# Patient Record
Sex: FEMALE | Race: WHITE | NOT HISPANIC OR LATINO | URBAN - METROPOLITAN AREA
[De-identification: names, ages, dates, MRNs, and addresses within clinical notes are randomized per-mention and may not be internally consistent; named-entity substitution may affect disease eponyms.]

---

## 2021-03-31 ENCOUNTER — OFFICE VISIT (OUTPATIENT)
Dept: URBAN - METROPOLITAN AREA CLINIC 51 | Facility: CLINIC | Age: 81
End: 2021-03-31
Payer: MEDICARE

## 2021-03-31 DIAGNOSIS — H52.4 PRESBYOPIA: ICD-10-CM

## 2021-03-31 DIAGNOSIS — H25.13 AGE-RELATED NUCLEAR CATARACT, BILATERAL: ICD-10-CM

## 2021-03-31 DIAGNOSIS — H43.313 VITREOUS MEMBRANES AND STRANDS, BILATERAL: ICD-10-CM

## 2021-03-31 PROCEDURE — 99204 OFFICE O/P NEW MOD 45 MIN: CPT | Performed by: OPTOMETRIST

## 2021-03-31 PROCEDURE — 92134 CPTRZ OPH DX IMG PST SGM RTA: CPT | Performed by: OPTOMETRIST

## 2021-03-31 PROCEDURE — 92133 CPTRZD OPH DX IMG PST SGM ON: CPT | Performed by: OPTOMETRIST

## 2021-03-31 ASSESSMENT — INTRAOCULAR PRESSURE
OS: 16
OD: 16

## 2021-03-31 ASSESSMENT — VISUAL ACUITY
OD: 20/20
OS: 20/25

## 2021-03-31 NOTE — IMPRESSION/PLAN
Impression: Vitreous membranes and strands, bilateral: H43.313. Plan: Ed pt on process of vitreous syneresis. Pt to RTC ASAP if increase in floaters, flashes, or curtain or veil taking away vision.

## 2021-03-31 NOTE — IMPRESSION/PLAN
Impression: Presbyopia: H52.4. Plan: Updated and released pts SRx. Ed on chg in prescription OS>OD. Pt aware cataracts will still limit vision.

## 2021-03-31 NOTE — IMPRESSION/PLAN
Impression: Primary open-angle glaucoma, bilateral, indeterminate stage: H40.1134. Plan: RNFL OS>OD shows inferior thinning. shallow cups OU with PPA. On Latanoprost qhs OU - continue and will re-evaluate effectivity of medication at f/u. RTC within 1 mos for glc testing; VF/IOP check

## 2021-03-31 NOTE — IMPRESSION/PLAN
Impression: Age-related nuclear cataract, bilateral: H25.13. Plan: Cataract visually significant and effecting ADLs or vision. Ed changes glasses will not completely improve vision. Pt recently had back surgery and wishes to defers cataract sx at this time. Updated 4/21/21: Patient called back and wishes to proceed with cataract surgery. Surgical treatment is recommended. Surgical risks and benefits discussed. Patient elects surgical treatment. Recommend surgery OS then OD. Patient is candidate based on HVF needed prior to cataract surgery. 
Aim OD: plano  Aim OS: plano

## 2021-06-22 ENCOUNTER — OFFICE VISIT (OUTPATIENT)
Dept: URBAN - METROPOLITAN AREA CLINIC 52 | Facility: CLINIC | Age: 81
End: 2021-06-22
Payer: MEDICARE

## 2021-06-22 PROCEDURE — 92133 CPTRZD OPH DX IMG PST SGM ON: CPT | Performed by: OPHTHALMOLOGY

## 2021-06-22 PROCEDURE — 92004 COMPRE OPH EXAM NEW PT 1/>: CPT | Performed by: OPHTHALMOLOGY

## 2021-06-22 RX ORDER — PREDNISOLONE ACETATE 10 MG/ML
1 % SUSPENSION/ DROPS OPHTHALMIC
Qty: 10 | Refills: 1 | Status: ACTIVE
Start: 2021-06-22

## 2021-06-22 RX ORDER — KETOROLAC TROMETHAMINE 5 MG/ML
0.5 % SOLUTION/ DROPS OPHTHALMIC
Qty: 5 | Refills: 1 | Status: ACTIVE
Start: 2021-06-22

## 2021-06-22 RX ORDER — OFLOXACIN 3 MG/ML
0.3 % SOLUTION/ DROPS OPHTHALMIC
Qty: 5 | Refills: 1 | Status: ACTIVE
Start: 2021-06-22

## 2021-06-22 ASSESSMENT — INTRAOCULAR PRESSURE
OD: 16
OS: 18

## 2021-06-22 NOTE — IMPRESSION/PLAN
Impression: Primary open-angle glaucoma, bilateral, indeterminate stage: H40.1134. CCT: [id number]/[phone] Plan: IOP doing well on Latanoprost OU QHS - continue on current regimen. Will address glaucoma status after cataract surgery. Stressed the importance of using the eye drops the night before her appointment - including the night prior to her surgery and the night of her surgery.

## 2021-06-22 NOTE — IMPRESSION/PLAN
Impression: Combined forms of age-related cataract, bilateral: H25.813. Plan: OK for CEIOL OS then OD in [address] 27, RL2. AIM FAR. Discussed lens options, Standard or Toric - pending ELISE. Pt is not a candidate for MF lens. Discussed need for glasses for near vision with standard. Discussed diagnosis of cataracts. Cataracts are limiting vision. Discussed risks, benefits and alternatives to surgery including but not limited to: bleeding, infection, risk of vision loss, loss of the eye, need for other surgery. Patient voiced understanding and wishes to proceed. Recommend ELISE. Patient is not a candidate for Dexycu secondary to glaucoma status. Due to the density of the cataract, there is a hazy view of the retina. There may be underlying eye conditions that can't be seen today that may limit BCVA after surgery. Patient expressed understanding.

## 2021-06-30 ENCOUNTER — TESTING ONLY (OUTPATIENT)
Dept: URBAN - METROPOLITAN AREA CLINIC 52 | Facility: CLINIC | Age: 81
End: 2021-06-30
Payer: MEDICARE

## 2021-06-30 PROCEDURE — 92025 CPTRIZED CORNEAL TOPOGRAPHY: CPT | Performed by: OPHTHALMOLOGY

## 2021-06-30 ASSESSMENT — PACHYMETRY
OD: 23.07
OS: 2.73
OD: 2.79

## 2021-07-01 ENCOUNTER — OFFICE VISIT (OUTPATIENT)
Dept: URBAN - METROPOLITAN AREA CLINIC 52 | Facility: CLINIC | Age: 81
End: 2021-07-01
Payer: MEDICARE

## 2021-07-01 DIAGNOSIS — H25.813 COMBINED FORMS OF AGE-RELATED CATARACT, BILATERAL: Primary | ICD-10-CM

## 2021-07-01 PROCEDURE — 99213 OFFICE O/P EST LOW 20 MIN: CPT | Performed by: OPHTHALMOLOGY

## 2021-07-02 NOTE — IMPRESSION/PLAN
Impression: Combined forms of age-related cataract, bilateral: H25.813. Plan: Ok for CEIOL OS in [address], [address]. AIM FAR. Cataracts are limiting vision. Discussed risks, benefits and alternatives to surgery including but not limited to: bleeding, infection, risk of vision loss, loss of the eye, need for other surgery. Patient voiced understanding and wishes to proceed. Discussed doing Dexycu at the time of surgery. Recommend SA60WF +21. 0

## 2021-07-13 ENCOUNTER — SURGERY (OUTPATIENT)
Dept: URBAN - METROPOLITAN AREA SURGERY 20 | Facility: SURGERY | Age: 81
End: 2021-07-13
Payer: MEDICARE

## 2021-07-13 PROCEDURE — 66984 XCAPSL CTRC RMVL W/O ECP: CPT | Performed by: OPHTHALMOLOGY

## 2021-07-14 ENCOUNTER — POST-OPERATIVE VISIT (OUTPATIENT)
Dept: URBAN - METROPOLITAN AREA CLINIC 52 | Facility: CLINIC | Age: 81
End: 2021-07-14
Payer: MEDICARE

## 2021-07-14 PROCEDURE — 99024 POSTOP FOLLOW-UP VISIT: CPT | Performed by: OPTOMETRIST

## 2021-07-14 ASSESSMENT — INTRAOCULAR PRESSURE
OD: 14
OS: 15

## 2021-07-14 NOTE — IMPRESSION/PLAN
Impression: S/P Cataract Extraction by phacoemulsification with IOL placement OS - 1 Day. Encounter for surgical aftercare following surgery on a sense organ  Z48.810. Plan: Discussed findings with patient and re-educated on PO instructions. Instructed patient to call if any significant pain, loss of vision, flashes or new floaters. --Advised patient to use artificial tears for comfort.

## 2021-07-22 ENCOUNTER — POST-OPERATIVE VISIT (OUTPATIENT)
Dept: URBAN - METROPOLITAN AREA CLINIC 52 | Facility: CLINIC | Age: 81
End: 2021-07-22
Payer: MEDICARE

## 2021-07-22 DIAGNOSIS — H25.811 COMBINED FORMS OF AGE-RELATED CATARACT, RIGHT EYE: Primary | ICD-10-CM

## 2021-07-22 PROCEDURE — 99024 POSTOP FOLLOW-UP VISIT: CPT | Performed by: OPHTHALMOLOGY

## 2021-07-22 ASSESSMENT — VISUAL ACUITY: OS: 20/40

## 2021-07-22 ASSESSMENT — INTRAOCULAR PRESSURE: OS: 18

## 2021-07-22 NOTE — IMPRESSION/PLAN
Impression: S/P Cataract Extraction by phacoemulsification with IOL placement OS - 9 Days. Encounter for surgical aftercare following surgery on a sense organ  Z48.810. Excellent post op course   Post operative instructions reviewed - Condition is improving - Plan: Patient healing well. Continue drops as directed. Ok for CEIOL OD  in [address] 27, RL2. Distance target. Discussed need for glasses for near vision with standard. Discussed diagnosis of cataracts. Cataracts are limiting vision. Discussed risks, benefits and alternatives to surgery including but not limited to: bleeding, infection, risk of vision loss, loss of the eye, need for other surgery. Patient voiced understanding and wishes to proceed. Patient is not a candidate for Dexycu secondary to glaucoma status. Use SA60WF +21.0 --Continue Ketorolac 0.5%--Finish Ofloxacin 0.3%--Taper Prednisolone acetate 1% as directed--Advised patient to use artificial tears for comfort.

## 2021-08-10 ENCOUNTER — SURGERY (OUTPATIENT)
Dept: URBAN - METROPOLITAN AREA SURGERY 20 | Facility: SURGERY | Age: 81
End: 2021-08-10
Payer: MEDICARE

## 2021-08-10 PROCEDURE — 66984 XCAPSL CTRC RMVL W/O ECP: CPT | Performed by: OPHTHALMOLOGY

## 2021-08-11 ENCOUNTER — POST-OPERATIVE VISIT (OUTPATIENT)
Dept: URBAN - METROPOLITAN AREA CLINIC 52 | Facility: CLINIC | Age: 81
End: 2021-08-11
Payer: MEDICARE

## 2021-08-11 DIAGNOSIS — Z48.810 ENCOUNTER FOR SURGICAL AFTERCARE FOLLOWING SURGERY ON A SENSE ORGAN: Primary | ICD-10-CM

## 2021-08-11 PROCEDURE — 99024 POSTOP FOLLOW-UP VISIT: CPT | Performed by: OPTOMETRIST

## 2021-08-11 ASSESSMENT — INTRAOCULAR PRESSURE
OS: 15
OD: 20

## 2021-08-11 NOTE — IMPRESSION/PLAN
Impression: S/P Cataract Extraction by phacoemulsification with IOL placement OD - 1 Day. Encounter for surgical aftercare following surgery on a sense organ  Z48.810. Plan: Discussed findings with patient and re-educated on PO instructions. Instructed patient to call if any significant pain, loss of vision, flashes or new floaters. --Advised patient to use artificial tears for comfort.  Cont pt using pred, ketorolac, and ofloxacin LD @ 7AM (all drops)

## 2021-08-16 ENCOUNTER — POST-OPERATIVE VISIT (OUTPATIENT)
Dept: URBAN - METROPOLITAN AREA CLINIC 52 | Facility: CLINIC | Age: 81
End: 2021-08-16
Payer: MEDICARE

## 2021-08-16 DIAGNOSIS — Z96.1 PRESENCE OF INTRAOCULAR LENS: Primary | ICD-10-CM

## 2021-08-16 PROCEDURE — 99024 POSTOP FOLLOW-UP VISIT: CPT | Performed by: OPHTHALMOLOGY

## 2021-08-16 ASSESSMENT — INTRAOCULAR PRESSURE
OD: 14
OS: 13
OS: 18
OD: 18

## 2021-08-16 NOTE — IMPRESSION/PLAN
Impression: S/P Cataract Extraction by phacoemulsification with IOL placement OD - 6 Days. Presence of intraocular lens  Z96.1. Excellent post op course   Post operative instructions reviewed - Condition is improving - Opacified Capsule OS. Plan: Patient healing well. Continue drops as directed. Discussed subconj heme in the right eye. Patient is okay to continue without any eye drops. Will have patient come back for VF 24-2 in November then follow up in December with Dr. Denice Carey. Dr. Denice Carey to determine if the need for eye drops in recommended. --Finish Ofloxacin 0.3%--Continue Ketorolac 0.5%--Taper Prednisolone acetate 1% as directed--Advised patient to use artificial tears for comfort.

## 2021-11-17 ENCOUNTER — TESTING ONLY (OUTPATIENT)
Dept: URBAN - METROPOLITAN AREA CLINIC 52 | Facility: CLINIC | Age: 81
End: 2021-11-17
Payer: MEDICARE

## 2021-11-17 DIAGNOSIS — H40.1134 PRIMARY OPEN-ANGLE GLAUCOMA, BILATERAL, INDETERMINATE STAGE: Primary | ICD-10-CM

## 2021-11-17 PROCEDURE — 92083 EXTENDED VISUAL FIELD XM: CPT | Performed by: OPHTHALMOLOGY

## 2022-04-13 ENCOUNTER — OFFICE VISIT (OUTPATIENT)
Dept: URBAN - METROPOLITAN AREA CLINIC 52 | Facility: CLINIC | Age: 82
End: 2022-04-13
Payer: MEDICARE

## 2022-04-13 DIAGNOSIS — H40.1131 PRIMARY OPEN-ANGLE GLAUCOMA, BILATERAL, MILD STAGE: Primary | ICD-10-CM

## 2022-04-13 PROCEDURE — 99212 OFFICE O/P EST SF 10 MIN: CPT | Performed by: OPTOMETRIST

## 2022-04-13 NOTE — IMPRESSION/PLAN
Impression: Primary open-angle glaucoma, bilateral, mild stage: H40.1131.  Plan: IOPs remain stable and well-controlled without Latanoprost.

## 2022-10-12 ENCOUNTER — OFFICE VISIT (OUTPATIENT)
Dept: URBAN - METROPOLITAN AREA CLINIC 52 | Facility: CLINIC | Age: 82
End: 2022-10-12
Payer: MEDICARE

## 2022-10-12 DIAGNOSIS — H40.1131 PRIMARY OPEN-ANGLE GLAUCOMA, BILATERAL, MILD STAGE: Primary | ICD-10-CM

## 2022-10-12 PROCEDURE — 99212 OFFICE O/P EST SF 10 MIN: CPT | Performed by: OPTOMETRIST

## 2022-10-12 ASSESSMENT — INTRAOCULAR PRESSURE
OS: 13
OD: 12
OD: 13

## 2022-10-12 NOTE — IMPRESSION/PLAN
Impression: Primary open-angle glaucoma, bilateral, mild stage: H40.1131. Plan: IOPs remain stable and well-controlled without Latanoprost.

Monitor Q6mos without tx for now.

## 2023-04-12 ENCOUNTER — OFFICE VISIT (OUTPATIENT)
Dept: URBAN - METROPOLITAN AREA CLINIC 52 | Facility: CLINIC | Age: 83
End: 2023-04-12
Payer: MEDICARE

## 2023-04-12 DIAGNOSIS — H04.123 DRY EYE SYNDROME OF BILATERAL LACRIMAL GLANDS: ICD-10-CM

## 2023-04-12 DIAGNOSIS — H40.1131 PRIMARY OPEN-ANGLE GLAUCOMA, BILATERAL, MILD STAGE: Primary | ICD-10-CM

## 2023-04-12 DIAGNOSIS — H43.811 VITREOUS DEGENERATION, RIGHT EYE: ICD-10-CM

## 2023-04-12 DIAGNOSIS — Z96.1 PRESENCE OF INTRAOCULAR LENS: ICD-10-CM

## 2023-04-12 PROCEDURE — 92014 COMPRE OPH EXAM EST PT 1/>: CPT | Performed by: OPTOMETRIST

## 2023-04-12 PROCEDURE — 92133 CPTRZD OPH DX IMG PST SGM ON: CPT | Performed by: OPTOMETRIST

## 2023-04-12 ASSESSMENT — INTRAOCULAR PRESSURE
OD: 16
OS: 14

## 2023-04-12 ASSESSMENT — VISUAL ACUITY
OD: 20/20
OS: 20/25

## 2023-04-12 NOTE — IMPRESSION/PLAN
Impression: Primary open-angle glaucoma, bilateral, mild stage: H40.1131. Plan:  Ordered and reviewed RNFL OCT today RMFL OCT(4/12/23) BDL thin inferior OD, BDL thin inferior OS. IOPs remain stable and well-controlled without Latanoprost.


Monitor Q6mos without tx for now.

## 2023-04-12 NOTE — IMPRESSION/PLAN
Impression: Dry eye syndrome of bilateral lacrimal glands: H04.123. Plan: Recommended Systane Hydration, continue Pataday OU.

## 2023-10-13 ENCOUNTER — OFFICE VISIT (OUTPATIENT)
Dept: URBAN - METROPOLITAN AREA CLINIC 52 | Facility: CLINIC | Age: 83
End: 2023-10-13
Payer: MEDICARE

## 2023-10-13 DIAGNOSIS — H40.1131 PRIMARY OPEN-ANGLE GLAUCOMA, BILATERAL, MILD STAGE: Primary | ICD-10-CM

## 2023-10-13 PROCEDURE — 92083 EXTENDED VISUAL FIELD XM: CPT | Performed by: OPTOMETRIST

## 2023-10-13 PROCEDURE — 99213 OFFICE O/P EST LOW 20 MIN: CPT | Performed by: OPTOMETRIST

## 2023-10-13 ASSESSMENT — INTRAOCULAR PRESSURE
OS: 17
OD: 16